# Patient Record
Sex: MALE | Race: WHITE | Employment: OTHER | URBAN - METROPOLITAN AREA
[De-identification: names, ages, dates, MRNs, and addresses within clinical notes are randomized per-mention and may not be internally consistent; named-entity substitution may affect disease eponyms.]

---

## 2017-03-16 ENCOUNTER — OFFICE VISIT (OUTPATIENT)
Dept: INTERNAL MEDICINE | Age: 72
End: 2017-03-16

## 2017-03-16 VITALS
BODY MASS INDEX: 27.55 KG/M2 | SYSTOLIC BLOOD PRESSURE: 124 MMHG | OXYGEN SATURATION: 97 % | WEIGHT: 186 LBS | RESPIRATION RATE: 12 BRPM | HEART RATE: 62 BPM | DIASTOLIC BLOOD PRESSURE: 72 MMHG | HEIGHT: 69 IN | TEMPERATURE: 97.7 F

## 2017-03-16 DIAGNOSIS — I10 BENIGN ESSENTIAL HTN: ICD-10-CM

## 2017-03-16 DIAGNOSIS — I51.9 CARDIAC DISEASE: ICD-10-CM

## 2017-03-16 DIAGNOSIS — Z12.11 COLON CANCER SCREENING: ICD-10-CM

## 2017-03-16 DIAGNOSIS — F17.218 NICOTINE DEPENDENCE, CIGARETTES, WITH OTHER NICOTINE-INDUCED DISORDERS: ICD-10-CM

## 2017-03-16 DIAGNOSIS — J43.9 PULMONARY EMPHYSEMA, UNSPECIFIED EMPHYSEMA TYPE (HCC): ICD-10-CM

## 2017-03-16 DIAGNOSIS — I25.10 CORONARY ARTERY DISEASE INVOLVING NATIVE CORONARY ARTERY OF NATIVE HEART WITHOUT ANGINA PECTORIS: ICD-10-CM

## 2017-03-16 DIAGNOSIS — E78.2 MIXED HYPERLIPIDEMIA: ICD-10-CM

## 2017-03-16 DIAGNOSIS — R06.02 SHORTNESS OF BREATH: Primary | ICD-10-CM

## 2017-03-16 DIAGNOSIS — Z23 NEED FOR PNEUMOCOCCAL VACCINATION: ICD-10-CM

## 2017-03-16 PROCEDURE — G0296 VISIT TO DETERM LDCT ELIG: HCPCS | Performed by: FAMILY MEDICINE

## 2017-03-16 PROCEDURE — 99203 OFFICE O/P NEW LOW 30 MIN: CPT | Performed by: FAMILY MEDICINE

## 2017-03-16 PROCEDURE — 90670 PCV13 VACCINE IM: CPT | Performed by: FAMILY MEDICINE

## 2017-03-16 PROCEDURE — G0009 ADMIN PNEUMOCOCCAL VACCINE: HCPCS | Performed by: FAMILY MEDICINE

## 2017-03-16 RX ORDER — NITROGLYCERIN 0.4 MG/1
0.4 TABLET SUBLINGUAL EVERY 5 MIN PRN
Qty: 25 TABLET | Refills: 1 | Status: SHIPPED | OUTPATIENT
Start: 2017-03-16

## 2017-03-16 RX ORDER — LISINOPRIL 5 MG/1
5 TABLET ORAL
COMMUNITY
Start: 2017-02-01

## 2017-03-16 RX ORDER — ALBUTEROL SULFATE 90 UG/1
2 AEROSOL, METERED RESPIRATORY (INHALATION) EVERY 6 HOURS PRN
Qty: 1 INHALER | Refills: 3 | Status: SHIPPED | OUTPATIENT
Start: 2017-03-16

## 2017-03-16 ASSESSMENT — ENCOUNTER SYMPTOMS
WHEEZING: 0
COUGH: 0
CHEST TIGHTNESS: 0
SHORTNESS OF BREATH: 1

## 2017-03-17 ENCOUNTER — TELEPHONE (OUTPATIENT)
Dept: INTERNAL MEDICINE | Age: 72
End: 2017-03-17

## 2017-03-17 DIAGNOSIS — R06.02 SOB (SHORTNESS OF BREATH): Primary | ICD-10-CM

## 2017-03-17 DIAGNOSIS — J42 CHRONIC BRONCHITIS, UNSPECIFIED CHRONIC BRONCHITIS TYPE (HCC): ICD-10-CM

## 2017-03-20 ENCOUNTER — TELEPHONE (OUTPATIENT)
Dept: CASE MANAGEMENT | Age: 72
End: 2017-03-20

## 2017-03-21 ENCOUNTER — TELEPHONE (OUTPATIENT)
Dept: INTERNAL MEDICINE | Age: 72
End: 2017-03-21

## 2017-03-21 DIAGNOSIS — Z12.11 COLON CANCER SCREENING: ICD-10-CM

## 2017-03-21 LAB
CONTROL: NORMAL
HEMOCCULT STL QL: NEGATIVE

## 2017-03-21 PROCEDURE — 82274 ASSAY TEST FOR BLOOD FECAL: CPT | Performed by: FAMILY MEDICINE

## 2017-03-22 ENCOUNTER — TELEPHONE (OUTPATIENT)
Dept: INTERNAL MEDICINE | Age: 72
End: 2017-03-22

## 2017-03-22 DIAGNOSIS — T50.905A MEDICATION SIDE EFFECT, INITIAL ENCOUNTER: Primary | ICD-10-CM

## 2017-03-24 ENCOUNTER — HOSPITAL ENCOUNTER (OUTPATIENT)
Dept: LAB | Age: 72
Discharge: HOME | End: 2017-03-24
Payer: MEDICARE

## 2017-03-24 ENCOUNTER — APPOINTMENT (OUTPATIENT)
Dept: LAB | Age: 72
End: 2017-03-24
Payer: MEDICARE

## 2017-03-24 ENCOUNTER — HOSPITAL ENCOUNTER (OUTPATIENT)
Dept: CT IMAGING | Age: 72
Discharge: HOME | End: 2017-03-24
Payer: MEDICARE

## 2017-03-24 DIAGNOSIS — F17.218 NICOTINE DEPENDENCE, CIGARETTES, WITH OTHER NICOTINE-INDUCED DISORDERS: ICD-10-CM

## 2017-03-24 DIAGNOSIS — T50.905A MEDICATION SIDE EFFECT, INITIAL ENCOUNTER: ICD-10-CM

## 2017-03-24 LAB
CREAT SERPL-MCNC: 1.1 MG/DL (ref 0.7–1.2)
GFR AFRICAN AMERICAN: >60
GFR NON-AFRICAN AMERICAN: >60

## 2017-03-24 PROCEDURE — 82565 ASSAY OF CREATININE: CPT

## 2017-03-24 PROCEDURE — G0297 LDCT FOR LUNG CA SCREEN: HCPCS

## 2017-03-24 PROCEDURE — 36415 COLL VENOUS BLD VENIPUNCTURE: CPT

## 2017-03-27 DIAGNOSIS — R91.8 ABNORMAL CT LUNG SCREENING: Primary | ICD-10-CM

## 2017-03-28 ENCOUNTER — HOSPITAL ENCOUNTER (OUTPATIENT)
Dept: PULMONOLOGY | Age: 72
Discharge: HOME | End: 2017-03-28
Payer: MEDICARE

## 2017-03-28 DIAGNOSIS — R06.02 SOB (SHORTNESS OF BREATH): ICD-10-CM

## 2017-03-28 DIAGNOSIS — J42 CHRONIC BRONCHITIS, UNSPECIFIED CHRONIC BRONCHITIS TYPE (HCC): ICD-10-CM

## 2017-03-28 PROCEDURE — 94729 DIFFUSING CAPACITY: CPT

## 2017-03-28 PROCEDURE — 94726 PLETHYSMOGRAPHY LUNG VOLUMES: CPT

## 2017-03-28 PROCEDURE — 6360000002 HC RX W HCPCS: Performed by: FAMILY MEDICINE

## 2017-03-28 PROCEDURE — 94060 EVALUATION OF WHEEZING: CPT

## 2017-03-28 RX ORDER — ALBUTEROL SULFATE 2.5 MG/3ML
2.5 SOLUTION RESPIRATORY (INHALATION) ONCE
Status: COMPLETED | OUTPATIENT
Start: 2017-03-28 | End: 2017-03-28

## 2017-03-28 RX ADMIN — ALBUTEROL SULFATE 2.5 MG: 2.5 SOLUTION RESPIRATORY (INHALATION) at 13:30

## 2017-04-19 ENCOUNTER — OFFICE VISIT (OUTPATIENT)
Dept: PULMONOLOGY | Age: 72
End: 2017-04-19

## 2017-04-19 VITALS
OXYGEN SATURATION: 94 % | RESPIRATION RATE: 16 BRPM | BODY MASS INDEX: 27.4 KG/M2 | HEART RATE: 70 BPM | WEIGHT: 185 LBS | SYSTOLIC BLOOD PRESSURE: 110 MMHG | DIASTOLIC BLOOD PRESSURE: 68 MMHG | TEMPERATURE: 96.4 F | HEIGHT: 69 IN

## 2017-04-19 DIAGNOSIS — J84.10 GRANULOMATOUS LUNG DISEASE (HCC): ICD-10-CM

## 2017-04-19 DIAGNOSIS — J44.9 CHRONIC OBSTRUCTIVE PULMONARY DISEASE, UNSPECIFIED COPD TYPE (HCC): Primary | ICD-10-CM

## 2017-04-19 PROCEDURE — 99204 OFFICE O/P NEW MOD 45 MIN: CPT | Performed by: INTERNAL MEDICINE

## 2017-04-19 ASSESSMENT — ENCOUNTER SYMPTOMS
RHINORRHEA: 1
COUGH: 1
NAUSEA: 0
CHEST TIGHTNESS: 0
SHORTNESS OF BREATH: 1
WHEEZING: 0
VOMITING: 0
ABDOMINAL PAIN: 0
SORE THROAT: 0
SINUS PRESSURE: 0
DIARRHEA: 0

## 2017-04-20 DIAGNOSIS — J44.9 CHRONIC OBSTRUCTIVE PULMONARY DISEASE, UNSPECIFIED COPD TYPE (HCC): ICD-10-CM

## 2017-04-25 ENCOUNTER — HOSPITAL ENCOUNTER (OUTPATIENT)
Dept: PULMONOLOGY | Age: 72
Setting detail: THERAPIES SERIES
Discharge: HOME | End: 2017-04-25
Payer: MEDICARE

## 2017-04-25 PROCEDURE — G0424 PULMONARY REHAB W EXER: HCPCS

## 2017-04-27 ENCOUNTER — HOSPITAL ENCOUNTER (OUTPATIENT)
Dept: PULMONOLOGY | Age: 72
Setting detail: THERAPIES SERIES
Discharge: HOME | End: 2017-04-27
Payer: MEDICARE

## 2017-04-27 PROCEDURE — G0424 PULMONARY REHAB W EXER: HCPCS

## 2017-05-02 ENCOUNTER — HOSPITAL ENCOUNTER (OUTPATIENT)
Dept: PULMONOLOGY | Age: 72
Setting detail: THERAPIES SERIES
Discharge: HOME | End: 2017-05-02
Payer: MEDICARE

## 2017-05-02 PROCEDURE — G0424 PULMONARY REHAB W EXER: HCPCS

## 2017-05-04 ENCOUNTER — HOSPITAL ENCOUNTER (OUTPATIENT)
Dept: PULMONOLOGY | Age: 72
Setting detail: THERAPIES SERIES
Discharge: HOME | End: 2017-05-04
Payer: MEDICARE

## 2017-05-04 PROCEDURE — G0424 PULMONARY REHAB W EXER: HCPCS

## 2017-05-09 ENCOUNTER — HOSPITAL ENCOUNTER (OUTPATIENT)
Dept: PULMONOLOGY | Age: 72
Setting detail: THERAPIES SERIES
Discharge: HOME | End: 2017-05-09
Payer: MEDICARE

## 2017-05-09 PROCEDURE — G0424 PULMONARY REHAB W EXER: HCPCS

## 2017-05-11 ENCOUNTER — HOSPITAL ENCOUNTER (OUTPATIENT)
Dept: PULMONOLOGY | Age: 72
Setting detail: THERAPIES SERIES
Discharge: HOME | End: 2017-05-11
Payer: MEDICARE

## 2017-05-11 PROCEDURE — G0424 PULMONARY REHAB W EXER: HCPCS

## 2017-05-16 ENCOUNTER — HOSPITAL ENCOUNTER (OUTPATIENT)
Dept: PULMONOLOGY | Age: 72
Setting detail: THERAPIES SERIES
Discharge: HOME | End: 2017-05-16
Payer: MEDICARE

## 2017-05-16 PROCEDURE — G0424 PULMONARY REHAB W EXER: HCPCS

## 2017-05-18 ENCOUNTER — HOSPITAL ENCOUNTER (OUTPATIENT)
Dept: PULMONOLOGY | Age: 72
Setting detail: THERAPIES SERIES
Discharge: HOME | End: 2017-05-18
Payer: MEDICARE

## 2017-05-18 PROCEDURE — G0424 PULMONARY REHAB W EXER: HCPCS

## 2017-05-23 ENCOUNTER — HOSPITAL ENCOUNTER (OUTPATIENT)
Dept: PULMONOLOGY | Age: 72
Setting detail: THERAPIES SERIES
Discharge: HOME | End: 2017-05-23
Payer: MEDICARE

## 2017-05-23 PROCEDURE — G0424 PULMONARY REHAB W EXER: HCPCS

## 2017-05-24 ENCOUNTER — OFFICE VISIT (OUTPATIENT)
Dept: INTERNAL MEDICINE | Age: 72
End: 2017-05-24

## 2017-05-24 VITALS
HEIGHT: 69 IN | WEIGHT: 185.8 LBS | DIASTOLIC BLOOD PRESSURE: 60 MMHG | BODY MASS INDEX: 27.52 KG/M2 | HEART RATE: 58 BPM | SYSTOLIC BLOOD PRESSURE: 118 MMHG

## 2017-05-24 DIAGNOSIS — J44.9 COPD, SEVERE (HCC): Primary | ICD-10-CM

## 2017-05-24 DIAGNOSIS — Z11.59 NEED FOR HEPATITIS C SCREENING TEST: ICD-10-CM

## 2017-05-24 DIAGNOSIS — Z23 NEED FOR TDAP VACCINATION: ICD-10-CM

## 2017-05-24 PROCEDURE — 99213 OFFICE O/P EST LOW 20 MIN: CPT | Performed by: FAMILY MEDICINE

## 2017-05-24 ASSESSMENT — PATIENT HEALTH QUESTIONNAIRE - PHQ9
SUM OF ALL RESPONSES TO PHQ QUESTIONS 1-9: 0
1. LITTLE INTEREST OR PLEASURE IN DOING THINGS: 0
SUM OF ALL RESPONSES TO PHQ9 QUESTIONS 1 & 2: 0
2. FEELING DOWN, DEPRESSED OR HOPELESS: 0

## 2017-05-24 ASSESSMENT — ENCOUNTER SYMPTOMS
EYE ITCHING: 0
COUGH: 0
EYE PAIN: 0
SHORTNESS OF BREATH: 1
STRIDOR: 0
EYE DISCHARGE: 0

## 2017-05-25 ENCOUNTER — HOSPITAL ENCOUNTER (OUTPATIENT)
Dept: PULMONOLOGY | Age: 72
Setting detail: THERAPIES SERIES
Discharge: HOME | End: 2017-05-25
Payer: MEDICARE

## 2017-05-25 PROCEDURE — G0424 PULMONARY REHAB W EXER: HCPCS

## 2017-05-30 ENCOUNTER — HOSPITAL ENCOUNTER (OUTPATIENT)
Dept: PULMONOLOGY | Age: 72
Setting detail: THERAPIES SERIES
Discharge: HOME | End: 2017-05-30
Payer: MEDICARE

## 2017-05-30 LAB
CHOLESTEROL, TOTAL: 125 MG/DL
CHOLESTEROL/HDL RATIO: 2.5
HDLC SERPL-MCNC: 51 MG/DL (ref 35–70)
LDL CHOLESTEROL CALCULATED: 60 MG/DL (ref 0–160)
TRIGL SERPL-MCNC: 69 MG/DL
VLDLC SERPL CALC-MCNC: NORMAL MG/DL

## 2017-05-30 PROCEDURE — G0424 PULMONARY REHAB W EXER: HCPCS

## 2017-05-31 DIAGNOSIS — E78.2 MIXED HYPERLIPIDEMIA: Primary | ICD-10-CM

## 2017-06-06 ENCOUNTER — HOSPITAL ENCOUNTER (OUTPATIENT)
Dept: PULMONOLOGY | Age: 72
Setting detail: THERAPIES SERIES
Discharge: HOME | End: 2017-06-06
Payer: MEDICARE

## 2017-06-06 PROCEDURE — G0424 PULMONARY REHAB W EXER: HCPCS

## 2017-06-07 ENCOUNTER — OFFICE VISIT (OUTPATIENT)
Dept: PULMONOLOGY | Age: 72
End: 2017-06-07

## 2017-06-07 VITALS
HEART RATE: 57 BPM | TEMPERATURE: 96.1 F | SYSTOLIC BLOOD PRESSURE: 100 MMHG | BODY MASS INDEX: 27.55 KG/M2 | WEIGHT: 186 LBS | OXYGEN SATURATION: 96 % | HEIGHT: 69 IN | DIASTOLIC BLOOD PRESSURE: 66 MMHG | RESPIRATION RATE: 16 BRPM

## 2017-06-07 DIAGNOSIS — J44.9 CHRONIC OBSTRUCTIVE PULMONARY DISEASE, UNSPECIFIED COPD TYPE (HCC): Primary | ICD-10-CM

## 2017-06-07 DIAGNOSIS — J84.10 GRANULOMATOUS LUNG DISEASE (HCC): ICD-10-CM

## 2017-06-07 DIAGNOSIS — I25.10 CORONARY ARTERY DISEASE INVOLVING NATIVE CORONARY ARTERY OF NATIVE HEART WITHOUT ANGINA PECTORIS: ICD-10-CM

## 2017-06-07 PROCEDURE — 99214 OFFICE O/P EST MOD 30 MIN: CPT | Performed by: INTERNAL MEDICINE

## 2017-06-07 ASSESSMENT — ENCOUNTER SYMPTOMS
SORE THROAT: 0
DIARRHEA: 0
COUGH: 0
VOMITING: 0
CHEST TIGHTNESS: 0
RHINORRHEA: 0
SINUS PRESSURE: 0
SHORTNESS OF BREATH: 0
NAUSEA: 0
WHEEZING: 0
ABDOMINAL PAIN: 0

## 2017-06-13 ENCOUNTER — HOSPITAL ENCOUNTER (OUTPATIENT)
Dept: PULMONOLOGY | Age: 72
Setting detail: THERAPIES SERIES
Discharge: HOME | End: 2017-06-13
Payer: MEDICARE

## 2017-06-13 PROCEDURE — G0424 PULMONARY REHAB W EXER: HCPCS

## 2017-07-18 ENCOUNTER — HOSPITAL ENCOUNTER (OUTPATIENT)
Dept: PULMONOLOGY | Age: 72
Setting detail: THERAPIES SERIES
Discharge: HOME | End: 2017-07-18
Payer: MEDICARE

## 2017-07-18 PROCEDURE — G0424 PULMONARY REHAB W EXER: HCPCS

## 2017-07-27 ENCOUNTER — APPOINTMENT (OUTPATIENT)
Dept: PULMONOLOGY | Age: 72
End: 2017-07-27
Payer: MEDICARE

## 2017-10-11 ENCOUNTER — OFFICE VISIT (OUTPATIENT)
Dept: PULMONOLOGY | Age: 72
End: 2017-10-11

## 2017-10-11 VITALS
TEMPERATURE: 98.6 F | OXYGEN SATURATION: 98 % | WEIGHT: 185 LBS | HEIGHT: 69 IN | HEART RATE: 64 BPM | SYSTOLIC BLOOD PRESSURE: 118 MMHG | RESPIRATION RATE: 18 BRPM | BODY MASS INDEX: 27.4 KG/M2 | DIASTOLIC BLOOD PRESSURE: 66 MMHG

## 2017-10-11 DIAGNOSIS — I25.10 CORONARY ARTERY DISEASE INVOLVING NATIVE CORONARY ARTERY OF NATIVE HEART WITHOUT ANGINA PECTORIS: ICD-10-CM

## 2017-10-11 DIAGNOSIS — J44.9 CHRONIC OBSTRUCTIVE PULMONARY DISEASE, UNSPECIFIED COPD TYPE (HCC): Primary | ICD-10-CM

## 2017-10-11 DIAGNOSIS — J84.10 GRANULOMATOUS LUNG DISEASE (HCC): ICD-10-CM

## 2017-10-11 PROCEDURE — 99214 OFFICE O/P EST MOD 30 MIN: CPT | Performed by: INTERNAL MEDICINE

## 2017-10-11 ASSESSMENT — ENCOUNTER SYMPTOMS
RHINORRHEA: 0
DIARRHEA: 0
SORE THROAT: 0
COUGH: 1
ABDOMINAL PAIN: 0
SHORTNESS OF BREATH: 1
CHEST TIGHTNESS: 0
SINUS PRESSURE: 0
WHEEZING: 0
VOMITING: 0
NAUSEA: 0

## 2017-10-11 NOTE — PROGRESS NOTES
Subjective:     Radha Hu is a 71 y.o. male who complains today of:     Chief Complaint   Patient presents with    COPD       HPI  Patient is here for a follow-up visit regarding COPD. Since last visit patient reports marked improvement in his symptoms. He states that pulmonary rehabilitation has helped a lot. He is able to do all his usual daily activities and chores without any significant breathing difficulties. He has not had to use his rescue inhaler hardly at all. Allergies: Iodides  Past Medical History:   Diagnosis Date    COPD, severe ([address] 5/24/2017    Coronary artery disease involving native coronary artery of native heart without angina pectoris 3/16/2017    Eczema     Heart disease     Hyperlipidemia     Hypertension      Past Surgical History:   Procedure Laterality Date    CARDIOVASCULAR STRESS TEST  2008/ 2010    CORONARY ANGIOPLASTY WITH STENT PLACEMENT  2010    CORONARY ANGIOPLASTY WITH STENT PLACEMENT  2016    stent placed inside stent/lifeflight to [facility]      all teeth removed pt wears dentures     Family History   Problem Relation Age of Onset    Heart Disease Father     Heart Disease Sister     Other Sister      MS    High Blood Pressure Brother     Heart Disease Mother      Social History     Social History    Marital status:      Spouse name: N/A    Number of children: N/A    Years of education: N/A     Occupational History    Not on file. Social History Main Topics    Smoking status: Former Smoker     Packs/day: 1.00     Years: 50.00     Types: Cigarettes     Quit date: 4/1/2016    Smokeless tobacco: Never Used    Alcohol use 0.0 oz/week      Comment: rare    Drug use: No    Sexual activity: No     Other Topics Concern    Not on file     Social History Narrative    No narrative on file         Review of Systems   Constitutional: Negative for chills, diaphoresis, fatigue and fever.    HENT: Negative for congestion,

## 2017-11-20 ENCOUNTER — TELEPHONE (OUTPATIENT)
Dept: CASE MANAGEMENT | Age: 72
End: 2017-11-20

## 2017-11-20 DIAGNOSIS — R91.1 LUNG NODULE: Primary | ICD-10-CM

## 2017-11-20 NOTE — TELEPHONE ENCOUNTER
On 3/24/17 CT Lung Screening showed multiple lung nodules and was read as a Lung Rads 3 with a recommended repeat CT in 6 months. Please place order for [id number] CT Chest Diagnostic Low Dose if you feel it would be appropriate.

## 2018-06-11 LAB
CHOLESTEROL, TOTAL: 125 MG/DL
CHOLESTEROL/HDL RATIO: 2.4
HDLC SERPL-MCNC: 53 MG/DL (ref 35–70)
LDL CHOLESTEROL CALCULATED: 66 MG/DL (ref 0–160)
TRIGL SERPL-MCNC: 78 MG/DL
VLDLC SERPL CALC-MCNC: NORMAL MG/DL

## 2018-09-17 ENCOUNTER — OFFICE VISIT (OUTPATIENT)
Dept: INTERNAL MEDICINE | Age: 73
End: 2018-09-17
Payer: MEDICARE

## 2018-09-17 ENCOUNTER — HOSPITAL ENCOUNTER (OUTPATIENT)
Age: 73
Setting detail: SPECIMEN
Discharge: HOME | End: 2018-09-17
Payer: MEDICARE

## 2018-09-17 VITALS
BODY MASS INDEX: 26.19 KG/M2 | WEIGHT: 176.8 LBS | HEART RATE: 60 BPM | HEIGHT: 69 IN | DIASTOLIC BLOOD PRESSURE: 71 MMHG | SYSTOLIC BLOOD PRESSURE: 130 MMHG

## 2018-09-17 DIAGNOSIS — I51.9 CARDIAC DISEASE: ICD-10-CM

## 2018-09-17 DIAGNOSIS — I10 BENIGN ESSENTIAL HTN: ICD-10-CM

## 2018-09-17 DIAGNOSIS — I25.10 CORONARY ARTERY DISEASE INVOLVING NATIVE CORONARY ARTERY OF NATIVE HEART WITHOUT ANGINA PECTORIS: ICD-10-CM

## 2018-09-17 DIAGNOSIS — Z00.00 ROUTINE GENERAL MEDICAL EXAMINATION AT A HEALTH CARE FACILITY: Primary | ICD-10-CM

## 2018-09-17 DIAGNOSIS — J44.9 COPD, SEVERE (HCC): ICD-10-CM

## 2018-09-17 DIAGNOSIS — Z11.59 NEED FOR HEPATITIS C SCREENING TEST: ICD-10-CM

## 2018-09-17 DIAGNOSIS — Z12.12 SCREENING FOR COLORECTAL CANCER: ICD-10-CM

## 2018-09-17 DIAGNOSIS — Z12.11 SCREENING FOR COLORECTAL CANCER: ICD-10-CM

## 2018-09-17 DIAGNOSIS — E78.2 MIXED HYPERLIPIDEMIA: ICD-10-CM

## 2018-09-17 DIAGNOSIS — Z23 NEED FOR PROPHYLACTIC VACCINATION AGAINST STREPTOCOCCUS PNEUMONIAE (PNEUMOCOCCUS): ICD-10-CM

## 2018-09-17 LAB
ALBUMIN SERPL-MCNC: 3.9 G/DL (ref 3.9–4.9)
ALP BLD-CCNC: 112 U/L (ref 35–104)
ALT SERPL-CCNC: <5 U/L (ref 0–41)
ANION GAP SERPL CALCULATED.3IONS-SCNC: 10 MEQ/L (ref 7–13)
AST SERPL-CCNC: <5 U/L (ref 0–40)
BILIRUB SERPL-MCNC: 0.7 MG/DL (ref 0–1.2)
BUN BLDV-MCNC: 20 MG/DL (ref 8–23)
CALCIUM SERPL-MCNC: 9.1 MG/DL (ref 8.6–10.2)
CHLORIDE BLD-SCNC: 103 MEQ/L (ref 98–107)
CHOLESTEROL, TOTAL: 132 MG/DL (ref 0–199)
CO2: 26 MEQ/L (ref 22–29)
CREAT SERPL-MCNC: 1.1 MG/DL (ref 0.7–1.2)
GFR AFRICAN AMERICAN: >60
GFR NON-AFRICAN AMERICAN: >60
GLOBULIN: 2.9 G/DL (ref 2.3–3.5)
GLUCOSE BLD-MCNC: 83 MG/DL (ref 74–109)
HDLC SERPL-MCNC: 45 MG/DL (ref 40–59)
HEPATITIS C ANTIBODY INTERPRETATION: NORMAL
LDL CHOLESTEROL CALCULATED: 68 MG/DL (ref 0–129)
POTASSIUM SERPL-SCNC: 4.8 MEQ/L (ref 3.5–5.1)
SODIUM BLD-SCNC: 139 MEQ/L (ref 132–144)
TOTAL PROTEIN: 6.8 G/DL (ref 6.4–8.1)
TRIGL SERPL-MCNC: 95 MG/DL (ref 0–200)

## 2018-09-17 PROCEDURE — 90732 PPSV23 VACC 2 YRS+ SUBQ/IM: CPT | Performed by: FAMILY MEDICINE

## 2018-09-17 PROCEDURE — 99214 OFFICE O/P EST MOD 30 MIN: CPT | Performed by: FAMILY MEDICINE

## 2018-09-17 PROCEDURE — 80053 COMPREHEN METABOLIC PANEL: CPT

## 2018-09-17 PROCEDURE — 80061 LIPID PANEL: CPT

## 2018-09-17 PROCEDURE — G0009 ADMIN PNEUMOCOCCAL VACCINE: HCPCS | Performed by: FAMILY MEDICINE

## 2018-09-17 PROCEDURE — G0439 PPPS, SUBSEQ VISIT: HCPCS | Performed by: FAMILY MEDICINE

## 2018-09-17 PROCEDURE — 86803 HEPATITIS C AB TEST: CPT

## 2018-09-17 RX ORDER — ATORVASTATIN CALCIUM 40 MG/1
40 TABLET, FILM COATED ORAL DAILY
COMMUNITY

## 2018-09-17 ASSESSMENT — LIFESTYLE VARIABLES: HOW OFTEN DO YOU HAVE A DRINK CONTAINING ALCOHOL: 0

## 2018-09-17 ASSESSMENT — PATIENT HEALTH QUESTIONNAIRE - PHQ9
SUM OF ALL RESPONSES TO PHQ QUESTIONS 1-9: 0
SUM OF ALL RESPONSES TO PHQ QUESTIONS 1-9: 0

## 2018-09-17 ASSESSMENT — ANXIETY QUESTIONNAIRES: GAD7 TOTAL SCORE: 0

## 2018-09-17 NOTE — PATIENT INSTRUCTIONS
Advance Directives: Care Instructions  Your Care Instructions  An advance directive is a legal way to state your wishes at the end of your life. It tells your family and your doctor what to do if you can no longer say what you want. There are two main types of advance directives. You can change them any time that your wishes change. · A living will tells your family and your doctor your wishes about life support and other treatment. · A durable power of  for health care lets you name a person to make treatment decisions for you when you can't speak for yourself. This person is called a health care agent. If you do not have an advance directive, decisions about your medical care may be made by a doctor or a  who doesn't know you. It may help to think of an advance directive as a gift to the people who care for you. If you have one, they won't have to make tough decisions by themselves. Follow-up care is a key part of your treatment and safety. Be sure to make and go to all appointments, and call your doctor if you are having problems. It's also a good idea to know your test results and keep a list of the medicines you take. How can you care for yourself at home? · Discuss your wishes with your loved ones and your doctor. This way, there are no surprises. · Many states have a unique form. Or you might use a universal form that has been approved by many states. This kind of form can sometimes be completed and stored online. Your electronic copy will then be available wherever you have a connection to the Internet. In most cases, doctors will respect your wishes even if you have a form from a different state. · You don't need a  to do an advance directive. But you may want to get legal advice. · Think about these questions when you prepare an advance directive:  ¨ Who do you want to make decisions about your medical care if you are not able to?  Many people choose a family member or or do not want. What should you think about when choosing a health care agent? Choose your health care agent carefully. This person may or may not be a family member. Talk to the person before you make your final decision. Make sure he or she is comfortable with this responsibility. It's a good idea to choose someone who:  · Is at least 25years old. · Knows you well and understands what makes life meaningful for you. · Understands your Tenriism and moral values. · Will do what you want, not what he or she wants. · Will be able to make difficult choices at a stressful time. · Will be able to refuse or stop treatment, if that is what you would want, even if you could die. · Will be firm and confident with health professionals if needed. · Will ask questions to get necessary information. · Lives near you or agrees to travel to you if needed. Your family may help you make medical decisions while you can still be part of that process. But it is important to choose one person to be your health care agent in case you are not able to make decisions for yourself. If you don't fill out the legal form and name a health care agent, the decisions your family can make may be limited. Who will make decisions for you if you do not have a health care agent? If you don't have a health care agent or a living will, your family members may disagree about your medical care. And then some medical professionals who may not know you as well might have to make decisions for you. In some cases, a  makes the decisions. When you name a health care agent, it is very clear who has the power to make health decisions for you. How do you name a health care agent? You name your health care agent on a legal form. It is usually called a durable power of  for health care. Ask your hospital, state bar association, or office on aging where to find these forms.   You must sign the form to make it legal. Some states require legal in the state where you now live. Or you might use a universal form that has been approved by many states. This kind of form can sometimes be completed and stored online. Your electronic copy will then be available wherever you have a connection to the Internet. In most cases, doctors will respect your wishes even if you have a form from a different state. · You don't need an  to complete a living will. But legal advice can be helpful if your state's laws are unclear, your health history is complicated, or your family can't agree on what should be in your living will. · You can change your living will at any time. Some people find that their wishes about end-of-life care change as their health changes. · In addition to making a living will, think about completing a medical power of  form. This form lets you name the person you want to make end-of-life treatment decisions for you (your \"health care agent\") if you're not able to. Many hospitals and nursing homes will give you the forms you need to complete a living will and a medical power of . · Your living will is used only if you can't make or communicate decisions for yourself anymore. If you become able to make decisions again, you can accept or refuse any treatment, no matter what you wrote in your living will. · Your state may offer an online registry. This is a place where you can store your living will online so the doctors and nurses who need to treat you can find it right away. What should you think about when creating a living will? Talk about your end-of-life wishes with your family members and your doctor. Let them know what you want. That way the people making decisions for you won't be surprised by your choices. Think about these questions as you make your living will:  · Do you know enough about life support methods that might be used?  If not, talk to your doctor so you know what might be done if you can't breathe benefits include a comprehensive review of your medical history including lifestyle, illnesses that may run in your family, and various assessments and screenings as appropriate. After reviewing your medical record and screening and assessments performed today your provider may have ordered immunizations, labs, imaging, and/or referrals for you. A list of these orders (if applicable) as well as your Preventive Care list are included within your After Visit Summary for your review. Other Preventive Recommendations:    · A preventive eye exam performed by an eye specialist is recommended every 1-2 years to screen for glaucoma; cataracts, macular degeneration, and other eye disorders. · A preventive dental visit is recommended every 6 months. · Try to get at least 150 minutes of exercise per week or 10,000 steps per day on a pedometer . · Order or download the FREE \"Exercise & Physical Activity: Your Everyday Guide\" from [employer]. Call 7-456.783.7388 or search [employer] online. · You need 3547-1965 mg of calcium and 1548-0479 IU of vitamin D per day. It is possible to meet your calcium requirement with diet alone, but a vitamin D supplement is usually necessary to meet this goal.  · When exposed to the sun, use a sunscreen that protects against both UVA and UVB radiation with an SPF of 30 or greater. Reapply every 2 to 3 hours or after sweating, drying off with a towel, or swimming. · Always wear a seat belt when traveling in a car. Always wear a helmet when riding a bicycle or motorcycle.

## 2018-09-17 NOTE — PROGRESS NOTES
PLACEMENT  2010    CORONARY ANGIOPLASTY WITH STENT PLACEMENT  2016    stent placed inside stent/lifeflight to [facility]      all teeth removed pt wears dentures       Family History   Problem Relation Age of Onset    Heart Disease Father     Heart Disease Sister     Other Sister         MS    High Blood Pressure Brother     Heart Disease Mother        CareTeam (Including outside providers/suppliers regularly involved in providing care):   Patient Care Team:  Miguel Cheatham MD as PCP - General (Family Medicine)    Wt Readings from Last 3 Encounters:   09/17/18 176 lb 12.8 oz (80.2 kg)   10/11/17 185 lb (83.9 kg)   06/07/17 186 lb (84.4 kg)     Vitals:    09/17/18 1107   BP: 130/71   Site: Left Upper Arm   Position: Sitting   Cuff Size: Medium Adult   Pulse: 60   Weight: 176 lb 12.8 oz (80.2 kg)   Height: 5' 9\" (1.753 m)     Body mass index is 26.11 kg/m². Patient's complete Health Risk Assessment and screening values have been reviewed and are found in Flowsheets. The following problems were reviewed today and where indicated follow up appointments were made and/or referrals ordered. Positive Risk Factor Screenings with Interventions:     General Health:  General  In general, how would you say your health is?: Very Good  In the past 7 days, have you experienced any of the following?: None of These  Do you get the social and emotional support that you need?: Yes  Do you have a Living Will?: (!) No  General Health Risk Interventions:  · No Living Will: additional information provided    Health Habits/Nutrition:  Health Habits/Nutrition  Do you exercise for at least 20 minutes 2-3 times per week?: Yes  Have you lost any weight without trying in the past 3 months?: No  Do you eat fewer than 2 meals per day?: (!) Yes  Have you seen a dentist within the past year?: (!) No (dentures)  Body mass index is 26.11 kg/m².   Health Habits/Nutrition Interventions:  · Nutritional issues:  educational

## 2018-09-25 ASSESSMENT — ENCOUNTER SYMPTOMS
CHEST TIGHTNESS: 0
COUGH: 0
WHEEZING: 0
SHORTNESS OF BREATH: 1

## 2018-09-26 NOTE — PROGRESS NOTES
Outpatient Prescriptions on File Prior to Visit   Medication Sig Dispense Refill    Tiotropium Bromide-Olodaterol (STIOLTO RESPIMAT) 2.5-2.5 MCG/ACT AERS Inhale 2 puffs into the lungs daily 3 Inhaler 0    lisinopril (PRINIVIL;ZESTRIL) 5 MG tablet Take 5 mg by mouth       nitroGLYCERIN (NITROSTAT) 0.4 MG SL tablet Place 1 tablet under the tongue every 5 minutes as needed for Chest pain 25 tablet 1    albuterol sulfate  (90 BASE) MCG/ACT inhaler Inhale 2 puffs into the lungs every 6 hours as needed for Shortness of Breath 1 Inhaler 3    metoprolol (LOPRESSOR) 25 MG tablet Take 25 mg by mouth daily       aspirin 81 MG tablet Take 81 mg by mouth daily       No current facility-administered medications on file prior to visit. Allergies   Allergen Reactions    Iodides Other (See Comments)     Stomach pain       Chief Complaint   Patient presents with    Medicare AWV       HPI    Hx of CAD s/p 2 stents follows with cardiology    COPD:  following now with Vladimir Erickson PFT results and CT lung Ca screening     Treatment Adherence:   Medication compliance:  compliant all of the time  Diet compliance:  compliant all of the time  Weight trend: stable  Current exercise: no regular exercise  Barriers: none    Hypertension:  Home blood pressure monitoring: No. Patient denies chest pain. Antihypertensive medication side effects: no medication side effects noted. Use of agents associated with hypertension: none. Sodium (mEq/L)   Date Value   09/17/2018 139    BUN (mg/dL)   Date Value   09/17/2018 20    Glucose (mg/dL)   Date Value   09/17/2018 83      Potassium (mEq/L)   Date Value   09/17/2018 4.8    CREATININE (mg/dL)   Date Value   09/17/2018 1.10         Hyperlipidemia:  No new myalgias or GI upset on atorvastatin (Lipitor).      Lab Results   Component Value Date    CHOL 132 09/17/2018    TRIG 95 09/17/2018    HDL 45 09/17/2018    LDLCALC 68 09/17/2018     Lab Results alcohol consumption    - Limit consumption to no more than 2 drinks per day in most men and no more  than 1 drink per day in women and lighter-weight persons       Return in about 1 year (around 9/17/2019) for Medicare Annual Wellness Visit in 1 year.      Cameron Stage

## 2018-10-04 ENCOUNTER — HOSPITAL ENCOUNTER (OUTPATIENT)
Dept: CT IMAGING | Age: 73
Discharge: HOME | End: 2018-10-06
Payer: MEDICARE

## 2018-10-04 DIAGNOSIS — R91.1 LUNG NODULE: ICD-10-CM

## 2018-10-04 PROCEDURE — 71250 CT THORAX DX C-: CPT

## 2018-10-25 ENCOUNTER — TELEPHONE (OUTPATIENT)
Dept: INTERNAL MEDICINE | Age: 73
End: 2018-10-25

## 2018-10-25 DIAGNOSIS — Z12.11 SCREENING FOR COLORECTAL CANCER: ICD-10-CM

## 2018-10-25 DIAGNOSIS — Z12.12 SCREENING FOR COLORECTAL CANCER: ICD-10-CM

## 2018-10-25 LAB
CONTROL: NORMAL
HEMOCCULT STL QL: NEGATIVE

## 2018-10-25 PROCEDURE — 82274 ASSAY TEST FOR BLOOD FECAL: CPT | Performed by: FAMILY MEDICINE

## 2019-04-08 LAB
BUN BLDV-MCNC: 18.6 MG/DL
CALCIUM SERPL-MCNC: 8.4 MG/DL
CHLORIDE BLD-SCNC: 108 MMOL/L
CHOLESTEROL, TOTAL: 120 MG/DL
CHOLESTEROL, TOTAL: 120 MG/DL
CHOLESTEROL/HDL RATIO: 2.1
CHOLESTEROL/HDL RATIO: 2.1
CO2: 27.4 MMOL/L
CREAT SERPL-MCNC: 1.2 MG/DL
GFR CALCULATED: NORMAL
GLUCOSE BLD-MCNC: 91 MG/DL
HDLC SERPL-MCNC: 56 MG/DL (ref 35–70)
HDLC SERPL-MCNC: 56 MG/DL (ref 35–70)
LDL CHOLESTEROL CALCULATED: 52 MG/DL (ref 0–160)
LDL CHOLESTEROL CALCULATED: 52 MG/DL (ref 0–160)
POTASSIUM SERPL-SCNC: 4.3 MMOL/L
SODIUM BLD-SCNC: 141 MMOL/L
TRIGL SERPL-MCNC: 62 MG/DL
TRIGL SERPL-MCNC: 62 MG/DL
VLDLC SERPL CALC-MCNC: NORMAL MG/DL
VLDLC SERPL CALC-MCNC: NORMAL MG/DL

## 2019-04-22 ENCOUNTER — TELEPHONE (OUTPATIENT)
Dept: PULMONOLOGY | Age: 74
End: 2019-04-22

## 2019-04-23 DIAGNOSIS — Z87.891 PERSONAL HISTORY OF TOBACCO USE: Primary | ICD-10-CM

## 2019-04-23 PROCEDURE — G0296 VISIT TO DETERM LDCT ELIG: HCPCS | Performed by: INTERNAL MEDICINE

## 2019-05-06 ENCOUNTER — TELEPHONE (OUTPATIENT)
Dept: PULMONOLOGY | Age: 74
End: 2019-05-06

## 2019-05-06 NOTE — TELEPHONE ENCOUNTER
RECEIVED A CALL FROM [facility] STATING THAT THE CT LUNG SCREENING WAS CANCELLED BY ANTHONY MARRERO (SHE IS IN CHARGE OF THE CT LUNG SCREENINGS) DUE TO THE FACT THAT PATIENT'S INSURANCE WILL ONLY COVER ONE CT SCREENING PER YEAR AND PATIENT JUST HAD ONE DONE ON 10/4/18.

## 2019-05-15 ENCOUNTER — OFFICE VISIT (OUTPATIENT)
Dept: PULMONOLOGY | Age: 74
End: 2019-05-15
Payer: MEDICARE

## 2019-05-15 VITALS
TEMPERATURE: 96.4 F | HEART RATE: 77 BPM | HEIGHT: 69 IN | BODY MASS INDEX: 25.92 KG/M2 | OXYGEN SATURATION: 97 % | RESPIRATION RATE: 16 BRPM | DIASTOLIC BLOOD PRESSURE: 68 MMHG | SYSTOLIC BLOOD PRESSURE: 118 MMHG | WEIGHT: 175 LBS

## 2019-05-15 DIAGNOSIS — J84.10 GRANULOMATOUS LUNG DISEASE (HCC): ICD-10-CM

## 2019-05-15 DIAGNOSIS — Z87.891 PERSONAL HISTORY OF TOBACCO USE: ICD-10-CM

## 2019-05-15 DIAGNOSIS — J44.9 CHRONIC OBSTRUCTIVE PULMONARY DISEASE, UNSPECIFIED COPD TYPE (HCC): Primary | ICD-10-CM

## 2019-05-15 PROCEDURE — G0296 VISIT TO DETERM LDCT ELIG: HCPCS | Performed by: INTERNAL MEDICINE

## 2019-05-15 PROCEDURE — 99214 OFFICE O/P EST MOD 30 MIN: CPT | Performed by: INTERNAL MEDICINE

## 2019-05-15 ASSESSMENT — ENCOUNTER SYMPTOMS
CHEST TIGHTNESS: 0
VOMITING: 0
NAUSEA: 0
DIARRHEA: 0
COUGH: 0
SORE THROAT: 0
SHORTNESS OF BREATH: 1
RHINORRHEA: 0
WHEEZING: 0
SINUS PRESSURE: 0
ABDOMINAL PAIN: 0

## 2019-05-15 NOTE — PROGRESS NOTES
Subjective:     Radha Vazquez is a 68 y.o. male who complains today of:     Chief Complaint   Patient presents with    Follow-up     COPD and medication refill       HPI  Patient is here for a follow-up visit regarding severe COPD and significant smoking history. Since previous visit patient had a screening CT of the chest done in October of last year which showed minimal nodular densities with a benign appearance a follow-up CT in 1 year was recommended. Patient remains dyspneic on exertion but otherwise no significant cough or wheezing lately. He does greatly benefit from regular use of Stiolto, which we will refill today. Allergies:   Iodides  Past Medical History:   Diagnosis Date    COPD, severe ([address] 5/24/2017    Coronary artery disease involving native coronary artery of native heart without angina pectoris 3/16/2017    Eczema     Heart disease     Hyperlipidemia     Hypertension      Past Surgical History:   Procedure Laterality Date    CARDIOVASCULAR STRESS TEST  2008/ 2010    CORONARY ANGIOPLASTY WITH STENT PLACEMENT  2010    CORONARY ANGIOPLASTY WITH STENT PLACEMENT  2016    stent placed inside stent/lifeflight to [facility]      all teeth removed pt wears dentures     Family History   Problem Relation Age of Onset    Heart Disease Father     Heart Disease Sister     Other Sister         MS    High Blood Pressure Brother     Heart Disease Mother      Social History     Socioeconomic History    Marital status:      Spouse name: Not on file    Number of children: Not on file    Years of education: Not on file    Highest education level: Not on file   Occupational History    Not on file   Social Needs    Financial resource strain: Not on file    Food insecurity:     Worry: Not on file     Inability: Not on file    Transportation needs:     Medical: Not on file     Non-medical: Not on file   Tobacco Use    Smoking status: Former Smoker     Packs/day: 1.00 Years: 50.00     Pack years: 50.00     Types: Cigarettes     Last attempt to quit: 4/1/2016     Years since quitting: 3.1    Smokeless tobacco: Never Used   Substance and Sexual Activity    Alcohol use: Yes     Alcohol/week: 0.0 oz     Comment: rare    Drug use: No    Sexual activity: Never   Lifestyle    Physical activity:     Days per week: Not on file     Minutes per session: Not on file    Stress: Not on file   Relationships    Social connections:     Talks on phone: Not on file     Gets together: Not on file     Attends [employer] service: Not on file     Active member of club or organization: Not on file     Attends meetings of clubs or organizations: Not on file     Relationship status: Not on file    Intimate partner violence:     Fear of current or ex partner: Not on file     Emotionally abused: Not on file     Physically abused: Not on file     Forced sexual activity: Not on file   Other Topics Concern    Not on file   Social History Narrative    Not on file         Review of Systems   Constitutional: Positive for fatigue. Negative for chills, diaphoresis and fever. HENT: Positive for postnasal drip. Negative for congestion, rhinorrhea, sinus pressure, sneezing and sore throat. Eyes: Negative for visual disturbance. Respiratory: Positive for shortness of breath. Negative for cough, chest tightness and wheezing. Cardiovascular: Negative for chest pain, palpitations and leg swelling. Gastrointestinal: Negative for abdominal pain, diarrhea, nausea and vomiting. Genitourinary: Negative for difficulty urinating and hematuria. Musculoskeletal: Positive for arthralgias. Negative for joint swelling and myalgias. Skin: Negative for rash. Allergic/Immunologic: Negative for environmental allergies. Neurological: Negative for dizziness, tremors, weakness and headaches. Psychiatric/Behavioral: Positive for sleep disturbance. Negative for behavioral problems.          Objective: Vitals:    05/15/19 1314   BP: 118/68   Site: Right Upper Arm   Position: Sitting   Cuff Size: Medium Adult   Pulse: 77   Resp: 16   Temp: 96.4 °F (35.8 °C)   TempSrc: Tympanic   SpO2: 97%   Weight: 175 lb (79.4 kg)   Height: 5' 9\" (1.753 m)         Physical Exam   Constitutional: He is oriented to person, place, and time. He appears well-developed and well-nourished. HENT:   Head: Normocephalic and atraumatic. Mouth/Throat: Oropharynx is clear and moist.   Eyes: Pupils are equal, round, and reactive to light. EOM are normal.   Neck: Normal range of motion. Neck supple. No JVD present. No tracheal deviation present. No thyromegaly present. Cardiovascular: Normal rate and regular rhythm. Exam reveals no gallop. No murmur heard. Pulmonary/Chest: Effort normal. He has no wheezes. He has no rales. He exhibits no tenderness. Significant decrease in breath sounds bilaterally but no wheezes or rhonchi   Abdominal: He exhibits no distension. Musculoskeletal: Normal range of motion. He exhibits no edema. Neurological: He is alert and oriented to person, place, and time. He has normal reflexes. Skin: Skin is warm and dry. No rash noted. Psychiatric: He has a normal mood and affect. Assessment:      Diagnosis Orders   1. Chronic obstructive pulmonary disease, unspecified COPD type ([facility]     2. Personal history of tobacco use  ND VISIT TO DISCUSS LUNG CA SCREEN W LDCT    CT Lung Screening (Annual)    CT Lung Screening (Annual)   3. Granulomatous lung disease ([facility]       Discussed treatment for COPD as well as follow up CT screening in October otherwise I'll see him for follow-up in 6 months      Plan:     Orders Placed This Encounter   Procedures    CT Lung Screening (Annual)     Age: Patient is 68 y.o.     Smoking History: Social History    Tobacco Use      Smoking status: Former Smoker        Packs/day: 1.00        Years: 50.00        Pack years: 50        Types: Cigarettes        Quit date: 4/1/2016        Years since quitting: 3.1      Smokeless tobacco: Never Used    Alcohol use: Yes      Alcohol/week: 0.0 oz      Comment: rare    Drug use: No   Pack years: 50     Standing Status:   Future     Standing Expiration Date:   5/15/2020     Order Specific Question:   Is there documentation of shared decision making? Answer:   Yes     Order Specific Question:   Is this a low dose CT or a routine CT? Answer:   Low Dose CT [1]     Order Specific Question:   Is this the first (baseline) CT or an annual exam?     Answer: Annual [2]     Order Specific Question:   Does the patient show any signs or symptoms of lung cancer? Answer:   No     Order Specific Question:   Smoking Status? Answer: Former Smoker [4]     Order Specific Question:   Date quit smoking? (must be within 15 years)     Answer:   4/1/2016     Order Specific Question:   Smoking packs per day? Answer:   1     Order Specific Question:   Years smoking? Answer:   48    CT Lung Screening (Annual)     Standing Status:   Future     Standing Expiration Date:   11/15/2019     Order Specific Question:   Is there documentation of shared decision making? Answer:   Yes     Order Specific Question:   Does the patient show any signs or symptoms of lung cancer? Answer:   No     Order Specific Question:   Is this the first (baseline) CT or an annual exam?     Answer: Annual [2]     Order Specific Question:   Is this a low dose CT or a routine CT? Answer:   Low Dose CT [1]     Order Specific Question:   Smoking Status? Answer: Former Smoker [4]     Order Specific Question:   Date quit smoking? (must be within 15 years)     Answer:   4/1/2016     Order Specific Question:   Smoking packs per day? Answer:   1     Order Specific Question:   Years smoking?      Answer:   48    TN VISIT TO DISCUSS LUNG CA SCREEN W LDCT     Orders Placed This Encounter   Medications    Tiotropium Bromide-Olodaterol (STIOLTO RESPIMAT) 2.5-2.5 MCG/ACT AERS     Sig: Inhale 2 puffs into the lungs daily     Dispense:  3 Inhaler     Refill:  3           Return in about 6 months (around 11/15/2019) for after follow up CT. Love Gary MD        Low Dose CT (LDCT) Lung Screening criteria met   Age 50-69   Pack year smoking >30   Still smoking or less than 15 year since quit   No sign or symptoms of lung cancer   > 11 months since last LDCT     Risks and benefits of lung cancer screening with LDCT scans discussed:    Significance of positive screen - False-positive LDCT results often occur. 95% of all positive results do not lead to a diagnosis of cancer. Usually further imaging can resolve most false-positive results; however, some patients may require invasive procedures. Over diagnosis risk - 10% to 12% of screen-detected lung cancer cases are over diagnosed--that is, the cancer would not have been detected in the patient's lifetime without the screening. Need for follow up screens annually to continue lung cancer screening effectiveness     Risks associated with radiation from annual LDCT- Radiation exposure is about the same as for a mammogram, which is about 1/3 of the annual background radiation exposure from everyday life. Starting screening at age 54 is not likely to increase cancer risk from radiation exposure. Patients with comorbidities resulting in life expectancy of < 10 years, or that would preclude treatment of an abnormality identified on CT, should not be screened due to lack of benefit.     To obtain maximal benefit from this screening, smoking cessation and long-term abstinence from smoking is critical

## 2019-10-17 ENCOUNTER — TELEPHONE (OUTPATIENT)
Dept: ADMINISTRATIVE | Age: 74
End: 2019-10-17

## 2020-05-20 ENCOUNTER — TELEPHONE (OUTPATIENT)
Dept: INTERNAL MEDICINE | Age: 75
End: 2020-05-20

## 2020-05-20 NOTE — TELEPHONE ENCOUNTER
Gadiel Garcia was contacted to set up a video visit with Florence Yeager MD.     Spoke with: Patient    Patient encouraged to sign up for MyChart in order to complete Virtual Visits, if MyChart status was not already active. Patient not agreeableto sign up for a Virtual Visit with PCP within the next week. Reason for declining VV with PCP in the next week: Patient has moved out of the service area.       Ej Gonzalez